# Patient Record
Sex: FEMALE | Race: WHITE | Employment: UNEMPLOYED | URBAN - METROPOLITAN AREA
[De-identification: names, ages, dates, MRNs, and addresses within clinical notes are randomized per-mention and may not be internally consistent; named-entity substitution may affect disease eponyms.]

---

## 2018-07-28 ENCOUNTER — OFFICE VISIT (OUTPATIENT)
Dept: FAMILY MEDICINE CLINIC | Facility: CLINIC | Age: 28
End: 2018-07-28
Payer: COMMERCIAL

## 2018-07-28 VITALS
SYSTOLIC BLOOD PRESSURE: 132 MMHG | BODY MASS INDEX: 40.16 KG/M2 | WEIGHT: 265 LBS | HEIGHT: 68 IN | DIASTOLIC BLOOD PRESSURE: 84 MMHG | HEART RATE: 98 BPM | RESPIRATION RATE: 20 BRPM | TEMPERATURE: 98 F | OXYGEN SATURATION: 98 %

## 2018-07-28 DIAGNOSIS — L30.9 DERMATITIS: Primary | ICD-10-CM

## 2018-07-28 PROCEDURE — 99202 OFFICE O/P NEW SF 15 MIN: CPT | Performed by: PHYSICIAN ASSISTANT

## 2018-07-28 RX ORDER — VARENICLINE TARTRATE 0.5 (11)-1
KIT ORAL
COMMUNITY
Start: 2018-07-22

## 2018-07-28 RX ORDER — CLOTRIMAZOLE AND BETAMETHASONE DIPROPIONATE 10; .64 MG/G; MG/G
CREAM TOPICAL
Qty: 60 G | Refills: 0 | Status: SHIPPED | OUTPATIENT
Start: 2018-07-28

## 2018-07-28 RX ORDER — VARENICLINE TARTRATE 1 MG/1
TABLET, FILM COATED ORAL
COMMUNITY
Start: 2018-07-22 | End: 2019-10-03

## 2018-07-28 NOTE — PROGRESS NOTES
CHIEF COMPLAINT:   Patient presents with: Athlete's Foot: Left foot for 3 months. no improvement with OTC meds, possible spreading to Left/Right hands     HPI:   Kory Andrade is a 33year old female who presents for evaluation of a rash.   Per patient r fingertips, some areas also overlying scale, no warmth, edema, discharge, streaking, or tenderness to palpation. ASSESSMENT AND PLAN:   Stevenson Horowitz is a 33year old female who presents for evaluation of a rash.  Findings are consistent with:    ASSES

## 2018-07-28 NOTE — PATIENT INSTRUCTIONS
Apply cream as directed. Keep area clean and dry. Wash with mild soap/water. Do not pick or scratch areas. Watch for signs of infection as discussed- increased redness, warmth, swelling, drainage, or pain.   See dermatologist if this fails to improve s

## 2019-07-19 ENCOUNTER — HOSPITAL ENCOUNTER (EMERGENCY)
Age: 29
Discharge: HOME | End: 2019-07-19
Attending: EMERGENCY MEDICINE

## 2019-07-19 ENCOUNTER — APPOINTMENT (OUTPATIENT)
Dept: GENERAL RADIOLOGY | Age: 29
End: 2019-07-19
Attending: EMERGENCY MEDICINE

## 2019-07-19 VITALS
HEIGHT: 68 IN | OXYGEN SATURATION: 98 % | DIASTOLIC BLOOD PRESSURE: 80 MMHG | HEART RATE: 97 BPM | WEIGHT: 260 LBS | SYSTOLIC BLOOD PRESSURE: 147 MMHG | TEMPERATURE: 97 F | BODY MASS INDEX: 39.4 KG/M2 | RESPIRATION RATE: 20 BRPM

## 2019-07-19 DIAGNOSIS — S93.402A MODERATE LEFT ANKLE SPRAIN, INITIAL ENCOUNTER: Primary | ICD-10-CM

## 2019-07-19 LAB
POCT URINE PREGNANCY: NEGATIVE
PROCEDURE CONTROL: NORMAL

## 2019-07-19 PROCEDURE — 73610 X-RAY EXAM OF ANKLE: CPT | Performed by: EMERGENCY MEDICINE

## 2019-07-19 PROCEDURE — 99283 EMERGENCY DEPT VISIT LOW MDM: CPT

## 2019-07-19 PROCEDURE — 73630 X-RAY EXAM OF FOOT: CPT | Performed by: EMERGENCY MEDICINE

## 2019-07-19 PROCEDURE — 81025 URINE PREGNANCY TEST: CPT

## 2019-07-20 NOTE — ED PROVIDER NOTES
Patient Seen in: [facility] Emergency Department In [place]    History   Patient presents with:  Trauma (cardiovascular, musculoskeletal)    Stated Complaint: left ankle injury    HPI    The patient is a 13-year-old female who presents emergency room with noted to the lateral aspect of left ankle and foot at this time. There is no midshaft or proximal left tib-fib tenderness to palpation appreciated. There is no Achilles tendon tenderness or disruption appreciated in the left ankle.   There is no laceratio FINDINGS: No fracture or dislocation. Joint spaces are well maintained. Mild hallux valgus is noted. Small plantar calcaneal osteophyte. IMPRESSION: No fracture or dislocation is identified.    Dictated by: Kathie Abel MD on 7/19/2019 at 21:01     A

## 2019-10-03 ENCOUNTER — APPOINTMENT (OUTPATIENT)
Dept: GENERAL RADIOLOGY | Age: 29
End: 2019-10-03
Attending: PHYSICIAN ASSISTANT

## 2019-10-03 ENCOUNTER — HOSPITAL ENCOUNTER (EMERGENCY)
Age: 29
Discharge: HOME | End: 2019-10-03
Attending: EMERGENCY MEDICINE

## 2019-10-03 VITALS
BODY MASS INDEX: 40.92 KG/M2 | RESPIRATION RATE: 20 BRPM | WEIGHT: 270 LBS | OXYGEN SATURATION: 96 % | HEIGHT: 68 IN | HEART RATE: 109 BPM | SYSTOLIC BLOOD PRESSURE: 110 MMHG | DIASTOLIC BLOOD PRESSURE: 88 MMHG | TEMPERATURE: 98 F

## 2019-10-03 DIAGNOSIS — J18.9 COMMUNITY ACQUIRED PNEUMONIA OF RIGHT LUNG, UNSPECIFIED PART OF LUNG: Primary | ICD-10-CM

## 2019-10-03 PROCEDURE — 94664 DEMO&/EVAL PT USE INHALER: CPT

## 2019-10-03 PROCEDURE — 99283 EMERGENCY DEPT VISIT LOW MDM: CPT

## 2019-10-03 PROCEDURE — 71046 X-RAY EXAM CHEST 2 VIEWS: CPT | Performed by: PHYSICIAN ASSISTANT

## 2019-10-03 RX ORDER — PAROXETINE HYDROCHLORIDE 20 MG/1
10 TABLET, FILM COATED ORAL EVERY MORNING
COMMUNITY

## 2019-10-03 RX ORDER — CEFDINIR 300 MG/1
300 CAPSULE ORAL 2 TIMES DAILY
Qty: 20 CAPSULE | Refills: 0 | Status: SHIPPED | OUTPATIENT
Start: 2019-10-03 | End: 2019-10-13

## 2019-10-03 RX ORDER — ALBUTEROL SULFATE 90 UG/1
2 AEROSOL, METERED RESPIRATORY (INHALATION) EVERY 4 HOURS PRN
Qty: 1 INHALER | Refills: 0 | Status: SHIPPED | OUTPATIENT
Start: 2019-10-03 | End: 2019-11-02

## 2019-10-03 RX ORDER — BENZONATATE 100 MG/1
100 CAPSULE ORAL 3 TIMES DAILY PRN
Qty: 30 CAPSULE | Refills: 0 | Status: SHIPPED | OUTPATIENT
Start: 2019-10-03 | End: 2019-11-02

## 2019-10-03 NOTE — ED PROVIDER NOTES
Patient Seen in: THE [facility] Emergency Department In [place]      History   Patient presents with:  Cough/URI    Stated Complaint: cough/chest congestion x 1 week    HPI    Patient is a pleasant 18-year-old female.   She arrives with  for evaluation nasal mucosa without audible nasal congestion. Oropharynx is patent without evidence of erythema, exudates or deviation. No stridor to auscultation  Lung: No distress, RR, no retraction, breath sounds are clear bilaterally.   Frequent bronchospasm  Cardio albuterol, antibiotic, fluids. Get plenty of rest.  Low threshold for follow-up if worsening.         Disposition and Plan     Clinical Impression:  Community acquired pneumonia of right lung, unspecified part of lung  (primary encounter diagnosis)    Disp

## (undated) NOTE — LETTER
Date & Time: 10/3/2019, 3:06 PM  Patient: Melanie Smith  Encounter Provider(s):    [facility]       To Whom It May Concern:    Johnson Lugo was seen and treated in our department on 10/3/2019.  She should not return to work un

## (undated) NOTE — ED AVS SNAPSHOT
Michelle Walker   MRN: [id number]    Department:  [facility] in [place]   Date of Visit:  10/3/2019           Disclosure     Insurance plans vary and the physician(s) referred by the ER may not be covered by your plan.  Please contact tell this physician (or your personal doctor if your instructions are to return to your personal doctor) about any new or lasting problems. The primary care or specialist physician will see patients referred from the [facility] Emergency Department.  Fadi Farris

## (undated) NOTE — ED AVS SNAPSHOT
Page [place] O'Connor   MRN: [id number]    Department:  [facility] Emergency Department in [place]   Date of Visit:  7/19/2019           Disclosure     Insurance plans vary and the physician(s) referred by the ER may not be covered by your plan.  Please contact tell this physician (or your personal doctor if your instructions are to return to your personal doctor) about any new or lasting problems. The primary care or specialist physician will see patients referred from the [facility] Emergency Department.  Chase Park